# Patient Record
Sex: FEMALE | Race: WHITE | NOT HISPANIC OR LATINO
[De-identification: names, ages, dates, MRNs, and addresses within clinical notes are randomized per-mention and may not be internally consistent; named-entity substitution may affect disease eponyms.]

---

## 2022-07-07 ENCOUNTER — APPOINTMENT (OUTPATIENT)
Age: 82
End: 2022-07-07

## 2022-07-07 ENCOUNTER — EMERGENCY (EMERGENCY)
Facility: HOSPITAL | Age: 82
LOS: 1 days | Discharge: HOME | End: 2022-07-07
Admitting: EMERGENCY MEDICINE

## 2022-07-07 ENCOUNTER — TRANSCRIPTION ENCOUNTER (OUTPATIENT)
Age: 82
End: 2022-07-07

## 2022-07-07 VITALS
HEART RATE: 71 BPM | DIASTOLIC BLOOD PRESSURE: 57 MMHG | WEIGHT: 145.06 LBS | HEIGHT: 57 IN | TEMPERATURE: 98 F | RESPIRATION RATE: 18 BRPM | OXYGEN SATURATION: 94 % | SYSTOLIC BLOOD PRESSURE: 90 MMHG

## 2022-07-07 VITALS
SYSTOLIC BLOOD PRESSURE: 114 MMHG | HEART RATE: 91 BPM | TEMPERATURE: 98 F | OXYGEN SATURATION: 98 % | DIASTOLIC BLOOD PRESSURE: 84 MMHG | RESPIRATION RATE: 20 BRPM

## 2022-07-07 PROCEDURE — L9998: CPT

## 2022-07-07 RX ORDER — BEBTELOVIMAB 87.5 MG/ML
175 INJECTION, SOLUTION INTRAVENOUS ONCE
Refills: 0 | Status: COMPLETED | OUTPATIENT
Start: 2022-07-07 | End: 2022-07-07

## 2022-07-07 RX ADMIN — BEBTELOVIMAB 175 MILLIGRAM(S): 87.5 INJECTION, SOLUTION INTRAVENOUS at 14:09

## 2022-07-07 NOTE — ED PROVIDER NOTE - PROGRESS NOTE DETAILS
Patient has history of COVID-19 symptoms for [  ] days    Patient has high risk factors that include:  [  ] Chronic Kidney Disease [  ] Diabetes Mellitus [  ] Immune Suppressive [  ] Receiving Immunosuppressive Treatment [  ] Overweight/Obesity BMI greater than 25 [  ] Age greater than or equal 65 years [  ] Cardiovascular Disease [  ] HTN [  ] COPD/Other Chronic Respiratory Disease [  ] Sickle Cell Disease [  ] Congenital/Acquired Heart Disease [  ] Neurodevelopmental Disorder [  ] Medical related technology dependence [  ] Asthma/Chronic Respiratory Disease [  ] Immune Suppressive Disease [  ] Receiving Immune Suppressive Therapy [  ] Other:     Patient received prior COVID treatment that included [  ]  Patient provided explanation of monoclonal antibody infusion and is in agreement with treatment plan. See consent form in medical record. [ x ] Patient received monoclonal antibody infusion with no adverse reaction. Patient planned for discharge home and follow up with outpatient [facility] program.    [  ] Patient received monoclonal antibody infusion and experienced an adverse reaction. Symptoms include [  ]. Patient treated with [  ]. Patient planned for [  ] Patient has history of COVID-19 symptoms for [ 3-4 ] days    Patient has high risk factors that include:  [  ] Chronic Kidney Disease [ x ] Diabetes Mellitus [  ] Immune Suppressive [  ] Receiving Immunosuppressive Treatment [  ] Overweight/Obesity BMI greater than 25 [ x ] Age greater than or equal 65 years [  ] Cardiovascular Disease [ x ] HTN [  ] COPD/Other Chronic Respiratory Disease [  ] Sickle Cell Disease [  ] Congenital/Acquired Heart Disease [  ] Neurodevelopmental Disorder [  ] Medical related technology dependence [  ] Asthma/Chronic Respiratory Disease [  ] Immune Suppressive Disease [  ] Receiving Immune Suppressive Therapy [  ] Other:     Patient received prior COVID treatment that included [  ]  Patient provided explanation of monoclonal antibody infusion and is in agreement with treatment plan. See consent form in medical record.

## 2022-07-07 NOTE — ED PROVIDER NOTE - PATIENT PORTAL LINK FT
You can access the FollowMyHealth Patient Portal offered by [facility] by registering at the following website: [email or web address]. By joining [facility] [employer]’s FollowMyHealth portal, you will also be able to view your health information using other applications (apps) compatible with our system.

## 2022-07-07 NOTE — ED PROVIDER NOTE - OBJECTIVE STATEMENT
81 f PMHx aortic v repl, CVA, DM, HTN, HLD, ORIF rt femur  c/o fatigue and mild headache x 3-4 days; no fever or chills.  Very mild cough, nonproductive. No CP or SOB. Mild diarrhea, no BPBPR or melena.  Tested positive for Covid 2 days ago.  Here for [facility].  Vaccinated for covid with booster.

## 2022-07-07 NOTE — ED PROVIDER NOTE - PHYSICAL EXAMINATION
GENERAL: well-appearing, NAD  HEAD:  EYE: anicteric  ENT: MMM  CV:  RRR S1S2  PULM: normal WOB and RR.  CTAB.  GI:  :  SKIN: normal color and turgor.   MSK: no limb edema or muscular tenderness.  NEURO:  alert, clear sensorium, GUILLAUME, speech clear, steady gait. GENERAL: well-appearing elderly fem, NAD  HEAD:  EYE: anicteric  ENT: MMM  CV:  RRR S1S2  PULM: normal WOB and RR.  CTAB.  GI:  :  SKIN: normal color and turgor.   MSK: no limb edema or muscular tenderness.  NEURO:  alert, clear sensorium, GUILLAUME, speech clear, steady gait.

## 2022-07-08 ENCOUNTER — TRANSCRIPTION ENCOUNTER (OUTPATIENT)
Age: 82
End: 2022-07-08

## 2022-07-09 DIAGNOSIS — U07.1 COVID-19: ICD-10-CM

## 2022-07-09 DIAGNOSIS — I10 ESSENTIAL (PRIMARY) HYPERTENSION: ICD-10-CM

## 2022-07-09 DIAGNOSIS — E78.5 HYPERLIPIDEMIA, UNSPECIFIED: ICD-10-CM

## 2022-07-09 DIAGNOSIS — E11.9 TYPE 2 DIABETES MELLITUS WITHOUT COMPLICATIONS: ICD-10-CM

## 2025-06-16 PROBLEM — Z00.00 ENCOUNTER FOR PREVENTIVE HEALTH EXAMINATION: Status: ACTIVE | Noted: 2025-06-16

## 2025-06-17 PROBLEM — M25.511 RIGHT SHOULDER PAIN, UNSPECIFIED CHRONICITY: Status: ACTIVE | Noted: 2025-06-17

## 2025-06-20 ENCOUNTER — RESULT REVIEW (OUTPATIENT)
Age: 85
End: 2025-06-20

## 2025-06-20 ENCOUNTER — NON-APPOINTMENT (OUTPATIENT)
Age: 85
End: 2025-06-20

## 2025-06-20 ENCOUNTER — APPOINTMENT (OUTPATIENT)
Facility: CLINIC | Age: 85
End: 2025-06-20

## 2025-06-20 VITALS
RESPIRATION RATE: 22 BRPM | HEIGHT: 57 IN | WEIGHT: 138 LBS | OXYGEN SATURATION: 97 % | SYSTOLIC BLOOD PRESSURE: 126 MMHG | BODY MASS INDEX: 29.77 KG/M2 | TEMPERATURE: 98.3 F | HEART RATE: 83 BPM | DIASTOLIC BLOOD PRESSURE: 64 MMHG

## 2025-06-20 PROBLEM — M25.811 IMPINGEMENT OF RIGHT SHOULDER: Status: ACTIVE | Noted: 2025-06-20

## 2025-06-20 PROCEDURE — 20611 DRAIN/INJ JOINT/BURSA W/US: CPT | Mod: RT

## 2025-06-20 PROCEDURE — 99204 OFFICE O/P NEW MOD 45 MIN: CPT | Mod: 25

## 2025-07-30 ENCOUNTER — APPOINTMENT (OUTPATIENT)
Dept: ORTHOPEDIC SURGERY | Facility: CLINIC | Age: 85
End: 2025-07-30
Payer: MEDICARE

## 2025-07-30 VITALS
SYSTOLIC BLOOD PRESSURE: 122 MMHG | TEMPERATURE: 97.9 F | HEIGHT: 57 IN | BODY MASS INDEX: 29.77 KG/M2 | WEIGHT: 138 LBS | HEART RATE: 79 BPM | OXYGEN SATURATION: 98 % | RESPIRATION RATE: 20 BRPM | DIASTOLIC BLOOD PRESSURE: 70 MMHG

## 2025-07-30 DIAGNOSIS — S43.421A SPRAIN OF RIGHT ROTATOR CUFF CAPSULE, INITIAL ENCOUNTER: ICD-10-CM

## 2025-07-30 DIAGNOSIS — M19.011 PRIMARY OSTEOARTHRITIS, RIGHT SHOULDER: ICD-10-CM

## 2025-07-30 DIAGNOSIS — M75.21 BICIPITAL TENDINITIS, RIGHT SHOULDER: ICD-10-CM

## 2025-07-30 PROCEDURE — G2211 COMPLEX E/M VISIT ADD ON: CPT

## 2025-07-30 PROCEDURE — 99213 OFFICE O/P EST LOW 20 MIN: CPT

## 2025-07-30 RX ORDER — METHYLPREDNISOLONE 4 MG/1
4 TABLET ORAL
Qty: 1 | Refills: 0 | Status: ACTIVE | COMMUNITY
Start: 2025-07-30 | End: 1900-01-01